# Patient Record
Sex: FEMALE | Race: WHITE | NOT HISPANIC OR LATINO | RURAL
[De-identification: names, ages, dates, MRNs, and addresses within clinical notes are randomized per-mention and may not be internally consistent; named-entity substitution may affect disease eponyms.]

---

## 2018-01-03 ENCOUNTER — OFFICE (OUTPATIENT)
Dept: RURAL CLINIC 3 | Facility: CLINIC | Age: 33
End: 2018-01-03
Payer: COMMERCIAL

## 2018-01-03 VITALS
WEIGHT: 168 LBS | HEIGHT: 70 IN | HEART RATE: 95 BPM | DIASTOLIC BLOOD PRESSURE: 82 MMHG | SYSTOLIC BLOOD PRESSURE: 119 MMHG

## 2018-01-03 DIAGNOSIS — R94.5 ABNORMAL RESULTS OF LIVER FUNCTION STUDIES: ICD-10-CM

## 2018-01-03 DIAGNOSIS — K21.9 GASTRO-ESOPHAGEAL REFLUX DISEASE WITHOUT ESOPHAGITIS: ICD-10-CM

## 2018-01-03 DIAGNOSIS — K59.1 FUNCTIONAL DIARRHEA: ICD-10-CM

## 2018-01-03 DIAGNOSIS — R11.2 NAUSEA WITH VOMITING, UNSPECIFIED: ICD-10-CM

## 2018-01-03 PROCEDURE — 99203 OFFICE O/P NEW LOW 30 MIN: CPT | Performed by: INTERNAL MEDICINE

## 2018-01-03 RX ORDER — OMEPRAZOLE 40 MG/1
40 CAPSULE, DELAYED RELEASE ORAL
Qty: 90 | Refills: 4 | Status: ACTIVE

## 2018-01-17 ENCOUNTER — ON CAMPUS - OUTPATIENT (OUTPATIENT)
Dept: RURAL HOSPITAL 3 | Facility: HOSPITAL | Age: 33
End: 2018-01-17

## 2018-01-17 DIAGNOSIS — K64.1 SECOND DEGREE HEMORRHOIDS: ICD-10-CM

## 2018-01-17 DIAGNOSIS — R11.2 NAUSEA WITH VOMITING, UNSPECIFIED: ICD-10-CM

## 2018-01-17 DIAGNOSIS — R19.4 CHANGE IN BOWEL HABIT: ICD-10-CM

## 2018-01-17 DIAGNOSIS — K21.9 GASTRO-ESOPHAGEAL REFLUX DISEASE WITHOUT ESOPHAGITIS: ICD-10-CM

## 2018-01-17 DIAGNOSIS — K59.1 FUNCTIONAL DIARRHEA: ICD-10-CM

## 2018-01-17 DIAGNOSIS — K62.5 HEMORRHAGE OF ANUS AND RECTUM: ICD-10-CM

## 2018-01-17 PROCEDURE — 45380 COLONOSCOPY AND BIOPSY: CPT | Performed by: INTERNAL MEDICINE

## 2018-01-17 PROCEDURE — 43239 EGD BIOPSY SINGLE/MULTIPLE: CPT | Performed by: INTERNAL MEDICINE

## 2018-02-14 ENCOUNTER — OFFICE (OUTPATIENT)
Dept: RURAL CLINIC 3 | Facility: CLINIC | Age: 33
End: 2018-02-14

## 2018-02-14 VITALS
WEIGHT: 159 LBS | SYSTOLIC BLOOD PRESSURE: 121 MMHG | HEIGHT: 70 IN | HEART RATE: 93 BPM | DIASTOLIC BLOOD PRESSURE: 81 MMHG

## 2018-02-14 DIAGNOSIS — K21.9 GASTRO-ESOPHAGEAL REFLUX DISEASE WITHOUT ESOPHAGITIS: ICD-10-CM

## 2018-02-14 DIAGNOSIS — G47.00 INSOMNIA, UNSPECIFIED: ICD-10-CM

## 2018-02-14 DIAGNOSIS — R94.5 ABNORMAL RESULTS OF LIVER FUNCTION STUDIES: ICD-10-CM

## 2018-02-14 DIAGNOSIS — K59.1 FUNCTIONAL DIARRHEA: ICD-10-CM

## 2018-02-14 PROCEDURE — 99214 OFFICE O/P EST MOD 30 MIN: CPT | Performed by: INTERNAL MEDICINE

## 2018-02-14 RX ORDER — TRAZODONE HYDROCHLORIDE 100 MG/1
100 TABLET, FILM COATED ORAL
Qty: 90 | Refills: 3 | Status: ACTIVE
Start: 2018-02-14

## 2018-04-25 ENCOUNTER — OFFICE (OUTPATIENT)
Dept: RURAL CLINIC 3 | Facility: CLINIC | Age: 33
End: 2018-04-25

## 2018-04-25 VITALS
DIASTOLIC BLOOD PRESSURE: 81 MMHG | HEART RATE: 90 BPM | SYSTOLIC BLOOD PRESSURE: 114 MMHG | HEIGHT: 70 IN | WEIGHT: 158 LBS

## 2018-04-25 DIAGNOSIS — R94.5 ABNORMAL RESULTS OF LIVER FUNCTION STUDIES: ICD-10-CM

## 2018-04-25 DIAGNOSIS — G47.00 INSOMNIA, UNSPECIFIED: ICD-10-CM

## 2018-04-25 DIAGNOSIS — K21.9 GASTRO-ESOPHAGEAL REFLUX DISEASE WITHOUT ESOPHAGITIS: ICD-10-CM

## 2018-04-25 PROCEDURE — 99214 OFFICE O/P EST MOD 30 MIN: CPT | Performed by: INTERNAL MEDICINE

## 2018-04-25 RX ORDER — TRAZODONE HYDROCHLORIDE 100 MG/1
100 TABLET, FILM COATED ORAL
Qty: 90 | Refills: 3 | Status: ACTIVE
Start: 2018-02-14

## 2018-04-25 RX ORDER — DICYCLOMINE HYDROCHLORIDE 10 MG/1
20 CAPSULE ORAL
Qty: 60 | Refills: 3 | Status: ACTIVE
Start: 2018-04-25

## 2020-06-03 ENCOUNTER — OFFICE VISIT (OUTPATIENT)
Dept: FAMILY MEDICINE CLINIC | Facility: CLINIC | Age: 35
End: 2020-06-03

## 2020-06-03 VITALS
DIASTOLIC BLOOD PRESSURE: 72 MMHG | TEMPERATURE: 98.5 F | SYSTOLIC BLOOD PRESSURE: 127 MMHG | HEART RATE: 93 BPM | WEIGHT: 169.4 LBS | OXYGEN SATURATION: 97 %

## 2020-06-03 DIAGNOSIS — R60.9 EDEMA, UNSPECIFIED TYPE: Primary | ICD-10-CM

## 2020-06-03 PROBLEM — F10.10 ALCOHOL ABUSE: Status: ACTIVE | Noted: 2017-11-15

## 2020-06-03 PROBLEM — F17.200 TOBACCO DEPENDENCE: Status: ACTIVE | Noted: 2017-11-13

## 2020-06-03 PROCEDURE — 99203 OFFICE O/P NEW LOW 30 MIN: CPT | Performed by: FAMILY MEDICINE

## 2020-06-03 RX ORDER — POTASSIUM CHLORIDE 20 MEQ/1
20 TABLET, EXTENDED RELEASE ORAL DAILY
Qty: 30 TABLET | Refills: 5 | Status: SHIPPED | OUTPATIENT
Start: 2020-06-03

## 2020-06-03 RX ORDER — FUROSEMIDE 20 MG/1
20 TABLET ORAL DAILY
Qty: 30 TABLET | Refills: 5 | Status: SHIPPED | OUTPATIENT
Start: 2020-06-03

## 2020-06-03 NOTE — PROGRESS NOTES
Chief Complaint   Patient presents with   • Edema       Melvin Everett is a 34 y.o. female.     Patient is here today for swollen feet that started a couple of weeks ago.   Right foot and ankle swell more than the left.  Legs are painful to touch.  No shortness of breath.  No redness.     Leg Swelling   This is a new problem. The current episode started 1 to 4 weeks ago. The problem occurs constantly. The problem has been unchanged. Pertinent negatives include no abdominal pain, anorexia, arthralgias, chest pain, coughing, fatigue, fever, headaches or numbness. Nothing aggravates the symptoms. She has tried nothing for the symptoms. The treatment provided no relief.      I have reviewed and updated her medications, medical history and problem list during today's office visit.       Past Medical History :  Active Ambulatory Problems     Diagnosis Date Noted   • Tobacco dependence 11/13/2017   • Alcohol abuse 11/15/2017     Resolved Ambulatory Problems     Diagnosis Date Noted   • No Resolved Ambulatory Problems     No Additional Past Medical History       Medication List:    Current Outpatient Medications:   • None    No Known Allergies      Social History     Tobacco Use   • Smoking status: Current Every Day Smoker     Packs/day: 1.00     Types: Cigarettes   • Smokeless tobacco: Never Used   • Tobacco comment: pack a day    Substance Use Topics   • Alcohol use: Yes     Drinks per session: 3 or 4     Binge frequency: Daily or almost daily     Comment: daily, 2-3 glasses of vodka     Past Surgical History:   Procedure Laterality Date   • COLONOSCOPY       Family History   Problem Relation Age of Onset   • Thyroid disease Father          Review of Systems   Constitutional: Negative for fatigue, fever and unexpected weight gain.   Respiratory: Negative for cough, chest tightness, shortness of breath and wheezing.    Cardiovascular: Positive for leg swelling. Negative for chest pain and palpitations.    Gastrointestinal: Negative for abdominal pain and anorexia.   Genitourinary: Negative for decreased urine volume, difficulty urinating and dysuria.   Musculoskeletal: Negative for arthralgias.   Neurological: Negative for dizziness, syncope, light-headedness and numbness.       I have reviewed and confirmed the accuracy of the ROS as documented by the MA/LPN/RN Kesha Rivas MD      Objective   Vitals:    06/03/20 1412   BP: 127/72   Pulse: 93   Temp: 98.5 °F (36.9 °C)   TempSrc: Oral   SpO2: 97%   Weight: 76.8 kg (169 lb 6.4 oz)     There is no height or weight on file to calculate BMI.    Physical Exam   Constitutional: She is oriented to person, place, and time. She appears well-developed and well-nourished. No distress.   HENT:   Head: Normocephalic and atraumatic.   Right Ear: External ear normal.   Left Ear: External ear normal.   Mouth/Throat: Oropharynx is clear and moist.   Eyes: Pupils are equal, round, and reactive to light. Conjunctivae and EOM are normal. Right eye exhibits no discharge. Left eye exhibits no discharge. No scleral icterus.   Neck: Normal range of motion. Neck supple. No JVD present. No edema present.   Cardiovascular: Normal rate, regular rhythm and normal heart sounds.   No murmur heard.  Pulmonary/Chest: Effort normal and breath sounds normal. She has no rales.   Abdominal: Soft. She exhibits no distension. There is no tenderness. There is no guarding.   Musculoskeletal: She exhibits edema (1+).   Neurological: She is alert and oriented to person, place, and time. No cranial nerve deficit.   Skin: Skin is warm. Capillary refill takes less than 2 seconds. No rash noted.   Psychiatric: She has a normal mood and affect. Her behavior is normal. Thought content normal.       Assessment/Plan     Diagnoses and all orders for this visit:    1. Edema, unspecified type (Primary)  -     furosemide (LASIX) 20 MG tablet; Take 1 tablet by mouth Daily.  Dispense: 30 tablet; Refill:  5  -     potassium chloride (K-DUR,KLOR-CON) 20 MEQ CR tablet; Take 1 tablet by mouth Daily.  Dispense: 30 tablet; Refill: 5  -     Comprehensive Metabolic Panel  -     CBC & Differential  -     TSH    She was started on furosemide and lab studies were arranged.  Follow-up in 1 week.       I wore protective equipment throughout this patient encounter to include mask. Hand hygiene was performed before donning protective equipment and after removal when leaving the room.  Stethoscope was cleaned before and after patient visit with rubbing alcohol.  Patient also wore a mask.

## 2020-06-04 ENCOUNTER — TELEPHONE (OUTPATIENT)
Dept: FAMILY MEDICINE CLINIC | Facility: CLINIC | Age: 35
End: 2020-06-04

## 2020-06-04 DIAGNOSIS — D69.6 THROMBOCYTOPENIA (HCC): Primary | ICD-10-CM

## 2020-06-04 DIAGNOSIS — R74.8 ELEVATED LIVER ENZYMES: ICD-10-CM

## 2020-06-04 LAB
ALBUMIN SERPL-MCNC: 4.8 G/DL (ref 3.8–4.8)
ALBUMIN/GLOB SERPL: 2.2 {RATIO} (ref 1.2–2.2)
ALP SERPL-CCNC: 99 IU/L (ref 39–117)
ALT SERPL-CCNC: 29 IU/L (ref 0–32)
AST SERPL-CCNC: 62 IU/L (ref 0–40)
BASOPHILS # BLD AUTO: 0 X10E3/UL (ref 0–0.2)
BASOPHILS NFR BLD AUTO: 0 %
BILIRUB SERPL-MCNC: 2.9 MG/DL (ref 0–1.2)
BUN SERPL-MCNC: 6 MG/DL (ref 6–20)
BUN/CREAT SERPL: 9 (ref 9–23)
CALCIUM SERPL-MCNC: 9.6 MG/DL (ref 8.7–10.2)
CHLORIDE SERPL-SCNC: 101 MMOL/L (ref 96–106)
CO2 SERPL-SCNC: 23 MMOL/L (ref 20–29)
CREAT SERPL-MCNC: 0.69 MG/DL (ref 0.57–1)
EOSINOPHIL # BLD AUTO: 0.2 X10E3/UL (ref 0–0.4)
EOSINOPHIL NFR BLD AUTO: 3 %
ERYTHROCYTE [DISTWIDTH] IN BLOOD BY AUTOMATED COUNT: 13.1 % (ref 11.7–15.4)
GLOBULIN SER CALC-MCNC: 2.2 G/DL (ref 1.5–4.5)
GLUCOSE SERPL-MCNC: 85 MG/DL (ref 65–99)
HCT VFR BLD AUTO: 43.1 % (ref 34–46.6)
HGB BLD-MCNC: 14.6 G/DL (ref 11.1–15.9)
IMM GRANULOCYTES # BLD AUTO: 0 X10E3/UL (ref 0–0.1)
IMM GRANULOCYTES NFR BLD AUTO: 0 %
LYMPHOCYTES # BLD AUTO: 1.2 X10E3/UL (ref 0.7–3.1)
LYMPHOCYTES NFR BLD AUTO: 18 %
MCH RBC QN AUTO: 32.7 PG (ref 26.6–33)
MCHC RBC AUTO-ENTMCNC: 33.9 G/DL (ref 31.5–35.7)
MCV RBC AUTO: 96 FL (ref 79–97)
MONOCYTES # BLD AUTO: 0.6 X10E3/UL (ref 0.1–0.9)
MONOCYTES NFR BLD AUTO: 9 %
MORPHOLOGY BLD-IMP: ABNORMAL
NEUTROPHILS # BLD AUTO: 4.4 X10E3/UL (ref 1.4–7)
NEUTROPHILS NFR BLD AUTO: 70 %
PLATELET # BLD AUTO: 95 X10E3/UL (ref 150–450)
POTASSIUM SERPL-SCNC: 3.9 MMOL/L (ref 3.5–5.2)
PROT SERPL-MCNC: 7 G/DL (ref 6–8.5)
RBC # BLD AUTO: 4.47 X10E6/UL (ref 3.77–5.28)
SODIUM SERPL-SCNC: 141 MMOL/L (ref 134–144)
TSH SERPL DL<=0.005 MIU/L-ACNC: 1.62 UIU/ML (ref 0.45–4.5)
WBC # BLD AUTO: 6.4 X10E3/UL (ref 3.4–10.8)

## 2020-06-04 NOTE — TELEPHONE ENCOUNTER
----- Message from Kesha Rivas MD sent at 6/4/2020  9:20 AM EDT -----  Please let patient know that her labs results are showing some issues with her liver and she has low platelets.  I recommend an ultrasound of her entire abdomen (which will check her liver and spleen).   We can do this wherever it is convenient for her.  She is self pay, so she can call different locations ([facility], [facility], or [facility]) to price this test.  CPT code 43177.  If she has no preference, okay to set up downstairs.

## 2020-06-04 NOTE — TELEPHONE ENCOUNTER
I will order for [facility], if she is okay with that.  The self pay price was quoted as $200.  Downstairs they though the price was between $250 and $350.

## 2020-06-11 ENCOUNTER — HOSPITAL ENCOUNTER (OUTPATIENT)
Dept: ULTRASOUND IMAGING | Facility: HOSPITAL | Age: 35
Discharge: HOME | End: 2020-06-11
Admitting: FAMILY MEDICINE

## 2020-06-11 DIAGNOSIS — R74.8 ELEVATED LIVER ENZYMES: ICD-10-CM

## 2020-06-11 DIAGNOSIS — D69.6 THROMBOCYTOPENIA (HCC): ICD-10-CM

## 2020-06-11 PROCEDURE — 76705 ECHO EXAM OF ABDOMEN: CPT

## 2020-06-12 ENCOUNTER — OFFICE VISIT (OUTPATIENT)
Dept: FAMILY MEDICINE CLINIC | Facility: CLINIC | Age: 35
End: 2020-06-12

## 2020-06-12 VITALS
HEIGHT: 68 IN | RESPIRATION RATE: 18 BRPM | SYSTOLIC BLOOD PRESSURE: 124 MMHG | TEMPERATURE: 98.4 F | BODY MASS INDEX: 24.25 KG/M2 | WEIGHT: 160 LBS | OXYGEN SATURATION: 96 % | DIASTOLIC BLOOD PRESSURE: 64 MMHG | HEART RATE: 86 BPM

## 2020-06-12 DIAGNOSIS — R74.8 ELEVATED LIVER ENZYMES: ICD-10-CM

## 2020-06-12 DIAGNOSIS — D69.6 THROMBOCYTOPENIA (HCC): ICD-10-CM

## 2020-06-12 DIAGNOSIS — R60.9 EDEMA, UNSPECIFIED TYPE: Primary | ICD-10-CM

## 2020-06-12 PROCEDURE — 99213 OFFICE O/P EST LOW 20 MIN: CPT | Performed by: FAMILY MEDICINE

## 2020-06-12 NOTE — PROGRESS NOTES
Chief Complaint   Patient presents with   • Leg Swelling     follow up       Subjective   Shannon Everett is a 34 y.o. female.     Patient is here today for swollen feet that started a couple of weeks ago.   She was started on furosemide at her last office appointment and reports significant improvement in lower extremity symptoms.  Her weight is down approximately 9 pounds.  She is also here to follow-up on her test results.    Leg Swelling   This is a new problem. The current episode started 1 to 4 weeks ago. The problem occurs constantly. The problem has been gradually improving. Pertinent negatives include no abdominal pain, anorexia, arthralgias, chest pain, coughing, fatigue, fever, headaches or numbness. Nothing aggravates the symptoms. Treatments tried: Lasix. The treatment provided significant relief.        I have reviewed and updated her medications, medical history and problem list during today's office visit.       Past Medical History :  Active Ambulatory Problems     Diagnosis Date Noted   • Tobacco dependence 11/13/2017   • Alcohol abuse 11/15/2017   • Thrombocytopenia (CMS/HCC) 06/14/2020   • Elevated liver enzymes 06/14/2020     Resolved Ambulatory Problems     Diagnosis Date Noted   • No Resolved Ambulatory Problems     No Additional Past Medical History       Medication List:    Current Outpatient Medications:   •  furosemide (LASIX) 20 MG tablet, Take 1 tablet by mouth Daily., Disp: 30 tablet, Rfl: 5  •  potassium chloride (K-DUR,KLOR-CON) 20 MEQ CR tablet, Take 1 tablet by mouth Daily., Disp: 30 tablet, Rfl: 5    Social History     Tobacco Use   • Smoking status: Current Every Day Smoker     Packs/day: 1.00     Types: Cigarettes   • Smokeless tobacco: Never Used   • Tobacco comment: pack a day    Substance Use Topics   • Alcohol use: Yes     Drinks per session: 3 or 4     Binge frequency: Daily or almost daily     Comment: daily, 2-3 glasses of vodka       Review of Systems   Constitutional:  "Negative for fatigue, fever and unexpected weight gain.   Respiratory: Negative for cough, chest tightness, shortness of breath and wheezing.    Cardiovascular: Positive for leg swelling. Negative for chest pain and palpitations.   Gastrointestinal: Negative for abdominal pain and anorexia.   Genitourinary: Negative for decreased urine volume, difficulty urinating and dysuria.   Musculoskeletal: Negative for arthralgias.   Neurological: Negative for dizziness, syncope, light-headedness and numbness.       I have reviewed and confirmed the accuracy of the ROS as documented by the MA/LPN/RN Kesha Rivas MD      Objective   Vitals:    06/12/20 1056   BP: 124/64   BP Location: Right arm   Patient Position: Sitting   Cuff Size: Large Adult   Pulse: 86   Resp: 18   Temp: 98.4 °F (36.9 °C)   TempSrc: Oral   SpO2: 96%   Weight: 72.6 kg (160 lb)   Height: 172.7 cm (68\")     Body mass index is 24.33 kg/m².    Physical Exam   Constitutional: She is oriented to person, place, and time. She appears well-developed and well-nourished. No distress.   HENT:   Head: Normocephalic and atraumatic.   Mouth/Throat: Oropharynx is clear and moist.   Eyes: Pupils are equal, round, and reactive to light. Conjunctivae and EOM are normal. No scleral icterus.   Neck: Normal range of motion. Neck supple. No JVD present. No edema present.   Cardiovascular: Normal rate, regular rhythm and normal heart sounds.   Pulmonary/Chest: Effort normal and breath sounds normal. She has no rales.   Abdominal: Soft.   Musculoskeletal: She exhibits no edema (Edema has resolved).   Neurological: She is alert and oriented to person, place, and time. No cranial nerve deficit.   Skin: Skin is warm. Capillary refill takes less than 2 seconds. No rash noted. No erythema.   Psychiatric: She has a normal mood and affect. Her behavior is normal. Thought content normal.     Us Abdomen Limited    Result Date: 6/11/2020   1. Hepatosplenomegaly. 2. Diffusely " coarsened hepatic echotexture may reflect changes of hepatocellular disease or steatosis. No focal liver lesion. The liver does not appear grossly cirrhotic on this examination, but could be better characterized on MRI abdomen without and with contrast, if desired. 3. Common bile duct caliber is within normal limits. No intrahepatic biliary dilation is identified.  Electronically Signed By-Dr. mAy Lucas MD On:6/11/2020 8:57 AM This report was finalized on [id number] by Dr. Amy Lucas MD.    Recent Results (from the past 1344 hour(s))   Comprehensive Metabolic Panel    Collection Time: 06/03/20  3:10 PM   Result Value Ref Range    Glucose 85 65 - 99 mg/dL    BUN 6 6 - 20 mg/dL    Creatinine 0.69 0.57 - 1.00 mg/dL    eGFR Non African Am 113 >59 mL/min/1.73    eGFR African Am 130 >59 mL/min/1.73    BUN/Creatinine Ratio 9 9 - 23    Sodium 141 134 - 144 mmol/L    Potassium 3.9 3.5 - 5.2 mmol/L    Chloride 101 96 - 106 mmol/L    Total CO2 23 20 - 29 mmol/L    Calcium 9.6 8.7 - 10.2 mg/dL    Total Protein 7.0 6.0 - 8.5 g/dL    Albumin 4.8 3.8 - 4.8 g/dL    Globulin 2.2 1.5 - 4.5 g/dL    A/G Ratio 2.2 1.2 - 2.2    Total Bilirubin 2.9 (H) 0.0 - 1.2 mg/dL    Alkaline Phosphatase 99 39 - 117 IU/L    AST (SGOT) 62 (H) 0 - 40 IU/L    ALT (SGPT) 29 0 - 32 IU/L   CBC & Differential    Collection Time: 06/03/20  3:10 PM   Result Value Ref Range    WBC 6.4 3.4 - 10.8 x10E3/uL    RBC 4.47 3.77 - 5.28 x10E6/uL    Hemoglobin 14.6 11.1 - 15.9 g/dL    Hematocrit 43.1 34.0 - 46.6 %    MCV 96 79 - 97 fL    MCH 32.7 26.6 - 33.0 pg    MCHC 33.9 31.5 - 35.7 g/dL    RDW 13.1 11.7 - 15.4 %    Platelets 95 (L) 150 - 450 x10E3/uL    Neutrophil Rel % 70 Not Estab. %    Lymphocyte Rel % 18 Not Estab. %    Monocyte Rel % 9 Not Estab. %    Eosinophil Rel % 3 Not Estab. %    Basophil Rel % 0 Not Estab. %    Neutrophils Absolute 4.4 1.4 - 7.0 x10E3/uL    Lymphocytes Absolute 1.2 0.7 - 3.1 x10E3/uL    Monocytes Absolute 0.6 0.1 - 0.9  x10E3/uL    Eosinophils Absolute 0.2 0.0 - 0.4 x10E3/uL    Basophils Absolute 0.0 0.0 - 0.2 x10E3/uL    Immature Granulocyte Rel % 0 Not Estab. %    Immature Grans Absolute 0.0 0.0 - 0.1 x10E3/uL    Hematology Comments: Note:    TSH    Collection Time: 06/03/20  3:10 PM   Result Value Ref Range    TSH 1.620 0.450 - 4.500 uIU/mL       Assessment/Plan     Diagnoses and all orders for this visit:    1. Edema, unspecified type (Primary)    2. Elevated liver enzymes    3. Thrombocytopenia (CMS/HCC)    Lower extremity edema is much improved.  Continue furosemide at this time.  Patient has been advised to continue cutting back on alcohol intake.  Complete cessation encouraged.  Patient was advised to avoid over-the-counter anti-inflammatory medications given new diagnosis of thrombocytopenia.  Recommendations at today's visit include an echocardiogram of the heart to better evaluate the lower extremity edema, MRI of the liver to better evaluate the abnormal findings seen on ultrasound, and referral to [facility] to discuss her chronic liver disease.  Patient is currently self-pay and will consider her options and let me know how she would like to proceed.    Return if symptoms worsen or fail to improve.         I wore protective equipment throughout this patient encounter to include mask. Hand hygiene was performed before donning protective equipment and after removal when leaving the room.  Stethoscope was cleaned before and after patient visit with rubbing alcohol.  Patient also wore a mask.

## 2020-06-14 PROBLEM — R74.8 ELEVATED LIVER ENZYMES: Status: ACTIVE | Noted: 2020-06-14

## 2020-06-14 PROBLEM — D69.6 THROMBOCYTOPENIA (HCC): Status: ACTIVE | Noted: 2020-06-14
